# Patient Record
Sex: MALE | Race: WHITE | NOT HISPANIC OR LATINO | Employment: OTHER | URBAN - METROPOLITAN AREA
[De-identification: names, ages, dates, MRNs, and addresses within clinical notes are randomized per-mention and may not be internally consistent; named-entity substitution may affect disease eponyms.]

---

## 2017-03-02 ENCOUNTER — OFFICE VISIT (OUTPATIENT)
Dept: ORTHOPEDICS | Age: 62
End: 2017-03-02

## 2017-03-02 DIAGNOSIS — M17.10 PRIMARY LOCALIZED OSTEOARTHROSIS, LOWER LEG, UNSPECIFIED LATERALITY: Primary | ICD-10-CM

## 2017-03-02 DIAGNOSIS — M75.121 COMPLETE TEAR OF RIGHT ROTATOR CUFF: ICD-10-CM

## 2017-03-02 PROCEDURE — 20610 DRAIN/INJ JOINT/BURSA W/O US: CPT | Performed by: ORTHOPAEDIC SURGERY

## 2017-03-02 RX ORDER — TRIAMCINOLONE ACETONIDE 40 MG/ML
120 INJECTION, SUSPENSION INTRA-ARTICULAR; INTRAMUSCULAR ONCE
Status: COMPLETED | OUTPATIENT
Start: 2017-03-02 | End: 2017-03-02

## 2017-03-02 RX ADMIN — TRIAMCINOLONE ACETONIDE 40 MG: 40 INJECTION, SUSPENSION INTRA-ARTICULAR; INTRAMUSCULAR at 11:53

## 2017-03-02 RX ADMIN — TRIAMCINOLONE ACETONIDE 40 MG: 40 INJECTION, SUSPENSION INTRA-ARTICULAR; INTRAMUSCULAR at 11:52

## 2017-03-02 RX ADMIN — TRIAMCINOLONE ACETONIDE 40 MG: 40 INJECTION, SUSPENSION INTRA-ARTICULAR; INTRAMUSCULAR at 11:54

## 2017-03-15 ENCOUNTER — OFFICE VISIT (OUTPATIENT)
Dept: ENDOCRINOLOGY | Age: 62
End: 2017-03-15

## 2017-03-15 VITALS
HEIGHT: 68 IN | WEIGHT: 214.29 LBS | HEART RATE: 80 BPM | SYSTOLIC BLOOD PRESSURE: 157 MMHG | DIASTOLIC BLOOD PRESSURE: 75 MMHG | BODY MASS INDEX: 32.48 KG/M2

## 2017-03-15 DIAGNOSIS — I10 ESSENTIAL HYPERTENSION: ICD-10-CM

## 2017-03-15 DIAGNOSIS — E78.2 MIXED HYPERLIPIDEMIA: ICD-10-CM

## 2017-03-15 LAB
GLUCOMETER GLUCOSE: 230
GLUCOSE, EST AVERAGE: NORMAL
HBA1C MFR BLD: 8.2 %
HBA1C MFR BLD: NORMAL % (ref 4.5–5.6)

## 2017-03-15 PROCEDURE — 36416 COLLJ CAPILLARY BLOOD SPEC: CPT | Performed by: INTERNAL MEDICINE

## 2017-03-15 PROCEDURE — 83036 HEMOGLOBIN GLYCOSYLATED A1C: CPT | Performed by: INTERNAL MEDICINE

## 2017-03-15 PROCEDURE — 99204 OFFICE O/P NEW MOD 45 MIN: CPT | Performed by: INTERNAL MEDICINE

## 2017-03-15 PROCEDURE — 82962 GLUCOSE BLOOD TEST: CPT | Performed by: INTERNAL MEDICINE

## 2017-03-15 RX ORDER — GLIPIZIDE 5 MG/1
5 TABLET ORAL
COMMUNITY

## 2017-03-22 ENCOUNTER — TELEPHONE (OUTPATIENT)
Dept: ENDOCRINOLOGY | Age: 62
End: 2017-03-22

## 2017-04-10 ENCOUNTER — TELEPHONE (OUTPATIENT)
Dept: PODIATRY | Age: 62
End: 2017-04-10

## 2017-04-11 ENCOUNTER — TELEPHONE (OUTPATIENT)
Dept: ENDOCRINOLOGY | Age: 62
End: 2017-04-11

## 2017-04-11 DIAGNOSIS — Z79.4 TYPE 2 DIABETES MELLITUS WITH COMPLICATION, WITH LONG-TERM CURRENT USE OF INSULIN (CMD): Primary | ICD-10-CM

## 2017-04-11 DIAGNOSIS — E11.8 TYPE 2 DIABETES MELLITUS WITH COMPLICATION, WITH LONG-TERM CURRENT USE OF INSULIN (CMD): Primary | ICD-10-CM

## 2017-05-01 ENCOUNTER — TELEPHONE (OUTPATIENT)
Dept: ENDOCRINOLOGY | Age: 62
End: 2017-05-01

## 2017-05-01 RX ORDER — CONTAINER,EMPTY
EACH MISCELLANEOUS
Qty: 1 EACH | Refills: 12 | Status: SHIPPED | OUTPATIENT
Start: 2017-05-01

## 2017-05-15 ENCOUNTER — OFFICE VISIT (OUTPATIENT)
Dept: PODIATRY | Age: 62
End: 2017-05-15

## 2017-05-15 DIAGNOSIS — Z79.4 TYPE 2 DIABETES MELLITUS WITH COMPLICATION, WITH LONG-TERM CURRENT USE OF INSULIN (CMD): Primary | ICD-10-CM

## 2017-05-15 DIAGNOSIS — L60.2 NAIL HYPERTROPHY: ICD-10-CM

## 2017-05-15 DIAGNOSIS — M79.674 PAIN IN TOES OF BOTH FEET: ICD-10-CM

## 2017-05-15 DIAGNOSIS — E11.8 TYPE 2 DIABETES MELLITUS WITH COMPLICATION, WITH LONG-TERM CURRENT USE OF INSULIN (CMD): Primary | ICD-10-CM

## 2017-05-15 DIAGNOSIS — M79.675 PAIN IN TOES OF BOTH FEET: ICD-10-CM

## 2017-05-15 PROCEDURE — 11721 DEBRIDE NAIL 6 OR MORE: CPT | Performed by: PODIATRIST

## 2017-06-06 ENCOUNTER — TELEPHONE (OUTPATIENT)
Dept: ENDOCRINOLOGY | Age: 62
End: 2017-06-06

## 2017-06-08 ENCOUNTER — OFFICE VISIT (OUTPATIENT)
Dept: ORTHOPEDICS | Age: 62
End: 2017-06-08

## 2017-06-08 DIAGNOSIS — M17.10 PRIMARY LOCALIZED OSTEOARTHROSIS, LOWER LEG, UNSPECIFIED LATERALITY: Primary | ICD-10-CM

## 2017-06-08 DIAGNOSIS — M75.121 COMPLETE TEAR OF RIGHT ROTATOR CUFF: ICD-10-CM

## 2017-06-08 PROCEDURE — 99212 OFFICE O/P EST SF 10 MIN: CPT | Performed by: ORTHOPAEDIC SURGERY

## 2017-06-08 PROCEDURE — 20610 DRAIN/INJ JOINT/BURSA W/O US: CPT | Performed by: ORTHOPAEDIC SURGERY

## 2017-06-08 RX ORDER — TRIAMCINOLONE ACETONIDE 40 MG/ML
80 INJECTION, SUSPENSION INTRA-ARTICULAR; INTRAMUSCULAR ONCE
Status: COMPLETED | OUTPATIENT
Start: 2017-06-08 | End: 2017-06-08

## 2017-06-08 RX ORDER — TRIAMCINOLONE ACETONIDE 40 MG/ML
40 INJECTION, SUSPENSION INTRA-ARTICULAR; INTRAMUSCULAR ONCE
Status: COMPLETED | OUTPATIENT
Start: 2017-06-08 | End: 2017-06-08

## 2017-06-08 RX ADMIN — TRIAMCINOLONE ACETONIDE 80 MG: 40 INJECTION, SUSPENSION INTRA-ARTICULAR; INTRAMUSCULAR at 12:00

## 2017-06-08 RX ADMIN — TRIAMCINOLONE ACETONIDE 40 MG: 40 INJECTION, SUSPENSION INTRA-ARTICULAR; INTRAMUSCULAR at 11:59

## 2017-06-20 ENCOUNTER — LAB SERVICES (OUTPATIENT)
Dept: LAB | Age: 62
End: 2017-06-20

## 2017-06-20 DIAGNOSIS — Z79.4 TYPE 2 DIABETES MELLITUS WITH COMPLICATION, WITH LONG-TERM CURRENT USE OF INSULIN (CMD): ICD-10-CM

## 2017-06-20 DIAGNOSIS — E11.8 TYPE 2 DIABETES MELLITUS WITH COMPLICATION, WITH LONG-TERM CURRENT USE OF INSULIN (CMD): ICD-10-CM

## 2017-06-20 LAB
BASOPHILS # BLD AUTO: 0 K/MCL (ref 0–0.3)
BASOPHILS NFR BLD AUTO: 0 %
CHOLEST SERPL-MCNC: 123 MG/DL
CHOLEST/HDLC SERPL: 3.4 {RATIO}
DIFFERENTIAL METHOD BLD: ABNORMAL
EOSINOPHIL # BLD AUTO: 0.1 K/MCL (ref 0.1–0.5)
EOSINOPHIL NFR SPEC: 1 %
ERYTHROCYTE [DISTWIDTH] IN BLOOD: 13.5 % (ref 11–15)
HCT VFR BLD CALC: 43.7 % (ref 39–51)
HDLC SERPL-MCNC: 36 MG/DL
HGB BLD-MCNC: 15.1 G/DL (ref 13–17)
LDLC SERPL-MCNC: 59 MG/DL
LENGTH OF FAST TIME PATIENT: 9 HRS
LYMPHOCYTES # BLD MANUAL: 2 K/MCL (ref 1–4)
LYMPHOCYTES NFR BLD MANUAL: 22 %
MCH RBC QN AUTO: 35.4 PG (ref 26–34)
MCHC RBC AUTO-ENTMCNC: 34.6 G/DL (ref 32–36.5)
MCV RBC AUTO: 102.3 FL (ref 78–100)
MONOCYTES # BLD MANUAL: 0.7 K/MCL (ref 0.3–0.9)
MONOCYTES NFR BLD MANUAL: 8 %
NEUTROPHILS # BLD AUTO: 6.2 K/MCL (ref 1.8–7.7)
NEUTROPHILS NFR BLD AUTO: 69 %
NONHDLC SERPL-MCNC: 87 MG/DL
PLATELET # BLD: 216 K/MCL (ref 140–450)
RBC # BLD: 4.27 MIL/MCL (ref 4.5–5.9)
TRIGL SERPL-MCNC: 140 MG/DL
TSH SERPL-ACNC: 3.37 MCUNITS/ML (ref 0.35–5)
WBC # BLD: 9.1 K/MCL (ref 4.2–11)

## 2017-06-20 PROCEDURE — 80061 LIPID PANEL: CPT | Performed by: INTERNAL MEDICINE

## 2017-06-20 PROCEDURE — 36415 COLL VENOUS BLD VENIPUNCTURE: CPT | Performed by: INTERNAL MEDICINE

## 2017-06-20 PROCEDURE — 82043 UR ALBUMIN QUANTITATIVE: CPT | Performed by: INTERNAL MEDICINE

## 2017-06-20 PROCEDURE — 80050 GENERAL HEALTH PANEL: CPT | Performed by: INTERNAL MEDICINE

## 2017-06-21 ENCOUNTER — OFFICE VISIT (OUTPATIENT)
Dept: ENDOCRINOLOGY | Age: 62
End: 2017-06-21

## 2017-06-21 VITALS
HEART RATE: 73 BPM | WEIGHT: 221.4 LBS | SYSTOLIC BLOOD PRESSURE: 142 MMHG | HEIGHT: 69 IN | DIASTOLIC BLOOD PRESSURE: 78 MMHG | BODY MASS INDEX: 32.79 KG/M2

## 2017-06-21 DIAGNOSIS — E78.2 MIXED HYPERLIPIDEMIA: ICD-10-CM

## 2017-06-21 DIAGNOSIS — I10 ESSENTIAL HYPERTENSION: ICD-10-CM

## 2017-06-21 LAB
ALBUMIN SERPL-MCNC: 4.3 G/DL (ref 3.6–5.1)
ALBUMIN/GLOB SERPL: 1.9 {RATIO} (ref 1–2.4)
ALP SERPL-CCNC: 90 UNITS/L (ref 45–117)
ALT SERPL-CCNC: 42 UNITS/L
ANION GAP SERPL CALC-SCNC: 17 MMOL/L (ref 10–20)
AST SERPL-CCNC: 22 UNITS/L
BILIRUB SERPL-MCNC: 0.8 MG/DL (ref 0.2–1)
BUN SERPL-MCNC: 16 MG/DL (ref 6–20)
BUN/CREAT SERPL: 37 (ref 7–25)
CALCIUM SERPL-MCNC: 9.7 MG/DL (ref 8.4–10.2)
CHLORIDE SERPL-SCNC: 101 MMOL/L (ref 98–107)
CO2 SERPL-SCNC: 23 MMOL/L (ref 21–32)
CREAT SERPL-MCNC: 0.43 MG/DL (ref 0.67–1.17)
CREAT UR-MCNC: 131 MG/DL
FASTING STATUS PATIENT QL REPORTED: 9 HRS
GLOBULIN SER-MCNC: 2.3 G/DL (ref 2–4)
GLUCOSE SERPL-MCNC: 188 MG/DL (ref 65–99)
GLUCOSE, EST AVERAGE: NORMAL
HBA1C MFR BLD: 7.7 %
HBA1C MFR BLD: NORMAL % (ref 4.5–5.6)
MICROALBUMIN UR-MCNC: 7.77 MG/DL
MICROALBUMIN/CREAT UR: 59.3 MCG/MG
POTASSIUM SERPL-SCNC: 4 MMOL/L (ref 3.4–5.1)
PROT SERPL-MCNC: 6.6 G/DL (ref 6.4–8.2)
SODIUM SERPL-SCNC: 137 MMOL/L (ref 135–145)

## 2017-06-21 PROCEDURE — 99214 OFFICE O/P EST MOD 30 MIN: CPT | Performed by: INTERNAL MEDICINE

## 2017-06-21 PROCEDURE — 83036 HEMOGLOBIN GLYCOSYLATED A1C: CPT | Performed by: INTERNAL MEDICINE

## 2017-08-10 ENCOUNTER — OFFICE VISIT (OUTPATIENT)
Dept: PODIATRY | Age: 62
End: 2017-08-10

## 2017-08-10 DIAGNOSIS — M79.674 PAIN IN TOES OF BOTH FEET: ICD-10-CM

## 2017-08-10 DIAGNOSIS — L60.2 NAIL HYPERTROPHY: Primary | ICD-10-CM

## 2017-08-10 DIAGNOSIS — M79.675 PAIN IN TOES OF BOTH FEET: ICD-10-CM

## 2017-08-10 PROCEDURE — 11721 DEBRIDE NAIL 6 OR MORE: CPT | Performed by: PODIATRIST

## 2017-08-14 ENCOUNTER — TELEPHONE (OUTPATIENT)
Dept: ENDOCRINOLOGY | Age: 62
End: 2017-08-14

## 2017-09-14 ENCOUNTER — OFFICE VISIT (OUTPATIENT)
Dept: ORTHOPEDICS | Age: 62
End: 2017-09-14

## 2017-09-14 VITALS — DIASTOLIC BLOOD PRESSURE: 83 MMHG | SYSTOLIC BLOOD PRESSURE: 151 MMHG | HEART RATE: 66 BPM

## 2017-09-14 DIAGNOSIS — M75.121 COMPLETE TEAR OF RIGHT ROTATOR CUFF: ICD-10-CM

## 2017-09-14 DIAGNOSIS — M17.10 PRIMARY LOCALIZED OSTEOARTHROSIS, LOWER LEG, UNSPECIFIED LATERALITY: Primary | ICD-10-CM

## 2017-09-14 DIAGNOSIS — M25.512 LEFT SHOULDER PAIN, UNSPECIFIED CHRONICITY: ICD-10-CM

## 2017-09-14 PROCEDURE — 20610 DRAIN/INJ JOINT/BURSA W/O US: CPT | Performed by: ORTHOPAEDIC SURGERY

## 2017-09-14 PROCEDURE — 99212 OFFICE O/P EST SF 10 MIN: CPT | Performed by: ORTHOPAEDIC SURGERY

## 2017-09-14 RX ORDER — TRIAMCINOLONE ACETONIDE 40 MG/ML
160 INJECTION, SUSPENSION INTRA-ARTICULAR; INTRAMUSCULAR ONCE
Status: COMPLETED | OUTPATIENT
Start: 2017-09-14 | End: 2017-09-14

## 2017-09-14 RX ADMIN — TRIAMCINOLONE ACETONIDE 160 MG: 40 INJECTION, SUSPENSION INTRA-ARTICULAR; INTRAMUSCULAR at 11:25

## 2017-10-24 ENCOUNTER — OFFICE VISIT (OUTPATIENT)
Dept: PODIATRY | Age: 62
End: 2017-10-24

## 2017-10-24 DIAGNOSIS — Z79.4 TYPE 2 DIABETES MELLITUS WITHOUT COMPLICATION, WITH LONG-TERM CURRENT USE OF INSULIN (CMD): Primary | ICD-10-CM

## 2017-10-24 DIAGNOSIS — L60.2 NAIL HYPERTROPHY: ICD-10-CM

## 2017-10-24 DIAGNOSIS — M79.675 PAIN IN TOES OF BOTH FEET: ICD-10-CM

## 2017-10-24 DIAGNOSIS — M79.674 PAIN IN TOES OF BOTH FEET: ICD-10-CM

## 2017-10-24 DIAGNOSIS — E11.9 TYPE 2 DIABETES MELLITUS WITHOUT COMPLICATION, WITH LONG-TERM CURRENT USE OF INSULIN (CMD): Primary | ICD-10-CM

## 2017-10-24 PROCEDURE — 11721 DEBRIDE NAIL 6 OR MORE: CPT | Performed by: PODIATRIST

## 2017-12-07 ENCOUNTER — TELEPHONE (OUTPATIENT)
Dept: ENDOCRINOLOGY | Age: 62
End: 2017-12-07

## 2017-12-11 ENCOUNTER — TELEPHONE (OUTPATIENT)
Dept: PODIATRY | Age: 62
End: 2017-12-11

## 2017-12-14 ENCOUNTER — OFFICE VISIT (OUTPATIENT)
Dept: PODIATRY | Age: 62
End: 2017-12-14

## 2017-12-14 ENCOUNTER — OFFICE VISIT (OUTPATIENT)
Dept: ORTHOPEDICS | Age: 62
End: 2017-12-14

## 2017-12-14 DIAGNOSIS — M75.121 COMPLETE TEAR OF RIGHT ROTATOR CUFF: ICD-10-CM

## 2017-12-14 DIAGNOSIS — M17.0 OSTEOARTHRITIS OF BOTH KNEES, UNSPECIFIED OSTEOARTHRITIS TYPE: Primary | ICD-10-CM

## 2017-12-14 DIAGNOSIS — L03.031 PARONYCHIA OF TOE OF RIGHT FOOT: ICD-10-CM

## 2017-12-14 DIAGNOSIS — M17.10 PRIMARY LOCALIZED OSTEOARTHROSIS, LOWER LEG, UNSPECIFIED LATERALITY: ICD-10-CM

## 2017-12-14 DIAGNOSIS — M79.674 PAIN IN RIGHT TOE(S): ICD-10-CM

## 2017-12-14 DIAGNOSIS — L60.2 NAIL HYPERTROPHY: Primary | ICD-10-CM

## 2017-12-14 DIAGNOSIS — M79.674 PAIN OF TOE OF RIGHT FOOT: ICD-10-CM

## 2017-12-14 PROCEDURE — 20610 DRAIN/INJ JOINT/BURSA W/O US: CPT | Performed by: ORTHOPAEDIC SURGERY

## 2017-12-14 PROCEDURE — 99212 OFFICE O/P EST SF 10 MIN: CPT | Performed by: ORTHOPAEDIC SURGERY

## 2017-12-14 PROCEDURE — 11720 DEBRIDE NAIL 1-5: CPT | Performed by: PODIATRIST

## 2017-12-14 PROCEDURE — 11730 AVULSION NAIL PLATE SIMPLE 1: CPT | Performed by: PODIATRIST

## 2017-12-14 PROCEDURE — 99213 OFFICE O/P EST LOW 20 MIN: CPT | Performed by: PODIATRIST

## 2017-12-14 RX ORDER — TRAMADOL HYDROCHLORIDE 50 MG/1
50 TABLET ORAL EVERY 6 HOURS PRN
Qty: 40 TABLET | Refills: 1 | Status: SHIPPED | OUTPATIENT
Start: 2017-12-14 | End: 2017-12-20 | Stop reason: CLARIF

## 2017-12-14 RX ORDER — TRIAMCINOLONE ACETONIDE 40 MG/ML
120 INJECTION, SUSPENSION INTRA-ARTICULAR; INTRAMUSCULAR ONCE
Status: COMPLETED | OUTPATIENT
Start: 2017-12-14 | End: 2017-12-14

## 2017-12-14 RX ADMIN — TRIAMCINOLONE ACETONIDE 120 MG: 40 INJECTION, SUSPENSION INTRA-ARTICULAR; INTRAMUSCULAR at 12:53

## 2017-12-20 ENCOUNTER — OFFICE VISIT (OUTPATIENT)
Dept: ENDOCRINOLOGY | Age: 62
End: 2017-12-20

## 2017-12-20 ENCOUNTER — TELEPHONE (OUTPATIENT)
Dept: ENDOCRINOLOGY | Age: 62
End: 2017-12-20

## 2017-12-20 VITALS
SYSTOLIC BLOOD PRESSURE: 140 MMHG | WEIGHT: 215.9 LBS | HEART RATE: 75 BPM | BODY MASS INDEX: 32.72 KG/M2 | DIASTOLIC BLOOD PRESSURE: 78 MMHG | HEIGHT: 68 IN

## 2017-12-20 DIAGNOSIS — E11.69 UNCONTROLLED TYPE 2 DIABETES MELLITUS WITH OTHER SPECIFIED COMPLICATION, UNSPECIFIED LONG TERM INSULIN USE STATUS: Primary | ICD-10-CM

## 2017-12-20 DIAGNOSIS — E11.65 UNCONTROLLED TYPE 2 DIABETES MELLITUS WITH OTHER SPECIFIED COMPLICATION, UNSPECIFIED LONG TERM INSULIN USE STATUS: Primary | ICD-10-CM

## 2017-12-20 LAB
EST. AVERAGE GLUCOSE BLD GHB EST-MCNC: NORMAL MG/DL
HBA1C MFR BLD: 6 %
HBA1C MFR BLD: NORMAL % (ref 4.5–5.6)

## 2017-12-20 PROCEDURE — 99214 OFFICE O/P EST MOD 30 MIN: CPT | Performed by: INTERNAL MEDICINE

## 2017-12-20 PROCEDURE — 83036 HEMOGLOBIN GLYCOSYLATED A1C: CPT | Performed by: INTERNAL MEDICINE

## 2017-12-28 ENCOUNTER — OFFICE VISIT (OUTPATIENT)
Dept: PODIATRY | Age: 62
End: 2017-12-28

## 2017-12-28 DIAGNOSIS — L03.031 PARONYCHIA OF TOE, RIGHT: ICD-10-CM

## 2017-12-28 DIAGNOSIS — S99.921A INJURY OF TOE ON RIGHT FOOT, INITIAL ENCOUNTER: Primary | ICD-10-CM

## 2017-12-28 DIAGNOSIS — M79.674 PAIN IN RIGHT TOE(S): ICD-10-CM

## 2017-12-28 PROCEDURE — 99213 OFFICE O/P EST LOW 20 MIN: CPT | Performed by: PODIATRIST

## 2017-12-28 PROCEDURE — 11730 AVULSION NAIL PLATE SIMPLE 1: CPT | Performed by: PODIATRIST

## 2017-12-28 RX ORDER — CEPHALEXIN 500 MG/1
500 CAPSULE ORAL 2 TIMES DAILY
Qty: 20 CAPSULE | Refills: 0 | Status: SHIPPED | OUTPATIENT
Start: 2017-12-28 | End: 2018-01-07

## 2018-05-17 ENCOUNTER — TELEPHONE (OUTPATIENT)
Dept: ENDOCRINOLOGY | Age: 63
End: 2018-05-17

## 2018-11-28 RX ORDER — DULAGLUTIDE 0.75 MG/.5ML
INJECTION, SOLUTION SUBCUTANEOUS
Refills: 0 | OUTPATIENT
Start: 2018-11-28

## 2018-11-28 RX ORDER — SITAGLIPTIN 100 MG/1
TABLET, FILM COATED ORAL
Qty: 90 TABLET | Refills: 0 | OUTPATIENT
Start: 2018-11-28

## 2018-12-17 RX ORDER — INSULIN GLARGINE 100 [IU]/ML
INJECTION, SOLUTION SUBCUTANEOUS
Refills: 0 | OUTPATIENT
Start: 2018-12-17

## 2024-04-08 ENCOUNTER — MEDICAL CORRESPONDENCE (OUTPATIENT)
Dept: HEALTH INFORMATION MANAGEMENT | Facility: CLINIC | Age: 69
End: 2024-04-08
Payer: COMMERCIAL

## 2024-04-10 ENCOUNTER — TRANSCRIBE ORDERS (OUTPATIENT)
Dept: OTHER | Age: 69
End: 2024-04-10

## 2024-04-10 DIAGNOSIS — I48.19 PERSISTENT ATRIAL FIBRILLATION (H): Primary | ICD-10-CM

## 2024-04-10 DIAGNOSIS — I50.22 CHRONIC SYSTOLIC CONGESTIVE HEART FAILURE (H): ICD-10-CM

## 2024-04-11 ENCOUNTER — TELEPHONE (OUTPATIENT)
Dept: CARDIOLOGY | Facility: CLINIC | Age: 69
End: 2024-04-11
Payer: COMMERCIAL

## 2024-04-11 NOTE — TELEPHONE ENCOUNTER
[facility] Call Center    Phone Message    May a detailed message be left on voicemail: yes     Reason for Call: Other: Patient has a couple of referrals from [place] to See Dr. Rodrigez and Dr. Quintana for the same concern. With Heart Failure. Please review and contact patient to schedule as appropriate.      Action Taken: Other: cardiology    Travel Screening: Not Applicable  Thank you!  [facility]

## 2024-04-16 ENCOUNTER — CARE COORDINATION (OUTPATIENT)
Dept: CARDIOLOGY | Facility: CLINIC | Age: 69
End: 2024-04-16
Payer: COMMERCIAL

## 2024-04-16 NOTE — PROGRESS NOTES
Referral from Monique Westfall NP with [facility] for heart failure management.  Pt is also being referred to Dr. Rodrigez for management for atrial fib and possible options.    4/22/24 - LVM  4/23/24 - Patient has not responded to multiple calls to schedule. Closing referral.

## 2024-04-19 ENCOUNTER — TELEPHONE (OUTPATIENT)
Dept: CARDIOLOGY | Facility: CLINIC | Age: 69
End: 2024-04-19
Payer: COMMERCIAL

## 2024-04-19 NOTE — TELEPHONE ENCOUNTER
RECORDS RECEIVED FROM:    DATE RECEIVED:    NOTES STATUS DETAILS   OFFICE NOTE from referring provider  Care Everywhere 4-8-24 Dr. Westfall   OFFICE NOTE from other cardiologists  Care Everywhere 4-8-24 Dr. Westfall   RECORDS from hospital/ED Care Everywhere 1-21-24   MEDICATION LIST In process    GENERAL CARDIO RECORDS   (ALL APPOINTMENT TYPES)     LABS (CBC,BMP,CMP, TSH) Care Everywhere    EKG (STRIPS & REPORTS) Care Everywhere 4-8-24   MONITORS (STRIPS & REPORTS) N/A    ECHOS (IMAGES AND REPORTS) In process 1-22-24   STRESS TESTS (IMAGES AND REPORTS) In process 9-5-20   cMRI (IMAGES AND REPORTS) N/A    Cardiac cath (IMAGES AND REPORTS) In process 7-24-23   CT/CTA (IMAGES AND REPORTS) In process 1-22-24     Action 4-19 [facility] Records and Imaging Requested:   Action Taken CTA Images  ECHO Images  XR Images  Cardiac Cath Images  NM Stress Test 1-22-24 1-22-24 11-27-23 7-24-23 9-5-20   Medication List

## 2024-04-23 ENCOUNTER — TELEPHONE (OUTPATIENT)
Dept: CARDIOLOGY | Facility: CLINIC | Age: 69
End: 2024-04-23
Payer: COMMERCIAL

## 2024-04-23 NOTE — TELEPHONE ENCOUNTER
Called pt to offer sooner appt with Elia   Pt stated that for the time being he will contact us to schedule.  Pt will follow up with Cardiology at local clinic and get back to us.

## 2024-04-24 ENCOUNTER — PRE VISIT (OUTPATIENT)
Dept: CARDIOLOGY | Facility: CLINIC | Age: 69
End: 2024-04-24
Payer: COMMERCIAL